# Patient Record
Sex: FEMALE | Race: WHITE | NOT HISPANIC OR LATINO | URBAN - METROPOLITAN AREA
[De-identification: names, ages, dates, MRNs, and addresses within clinical notes are randomized per-mention and may not be internally consistent; named-entity substitution may affect disease eponyms.]

---

## 2021-01-04 ENCOUNTER — WEB ENCOUNTER (OUTPATIENT)
Dept: URBAN - METROPOLITAN AREA CLINIC 74 | Facility: CLINIC | Age: 76
End: 2021-01-04

## 2021-01-04 ENCOUNTER — OFFICE VISIT (OUTPATIENT)
Dept: URBAN - METROPOLITAN AREA CLINIC 74 | Facility: CLINIC | Age: 76
End: 2021-01-04
Payer: MEDICARE

## 2021-01-04 DIAGNOSIS — Z12.11 COLON CANCER SCREENING: ICD-10-CM

## 2021-01-04 DIAGNOSIS — R43.2 DYSGEUSIA: ICD-10-CM

## 2021-01-04 DIAGNOSIS — K63.89 SMALL INTESTINAL BACTERIAL OVERGROWTH (SIBO): ICD-10-CM

## 2021-01-04 PROCEDURE — 99204 OFFICE O/P NEW MOD 45 MIN: CPT | Performed by: STUDENT IN AN ORGANIZED HEALTH CARE EDUCATION/TRAINING PROGRAM

## 2021-01-04 PROCEDURE — 3017F COLORECTAL CA SCREEN DOC REV: CPT | Performed by: STUDENT IN AN ORGANIZED HEALTH CARE EDUCATION/TRAINING PROGRAM

## 2021-01-04 PROCEDURE — G9903 PT SCRN TBCO ID AS NON USER: HCPCS | Performed by: STUDENT IN AN ORGANIZED HEALTH CARE EDUCATION/TRAINING PROGRAM

## 2021-01-04 PROCEDURE — G8484 FLU IMMUNIZE NO ADMIN: HCPCS | Performed by: STUDENT IN AN ORGANIZED HEALTH CARE EDUCATION/TRAINING PROGRAM

## 2021-01-04 PROCEDURE — G8427 DOCREV CUR MEDS BY ELIG CLIN: HCPCS | Performed by: STUDENT IN AN ORGANIZED HEALTH CARE EDUCATION/TRAINING PROGRAM

## 2021-01-04 PROCEDURE — G8420 CALC BMI NORM PARAMETERS: HCPCS | Performed by: STUDENT IN AN ORGANIZED HEALTH CARE EDUCATION/TRAINING PROGRAM

## 2021-01-04 RX ORDER — DOCUSATE SODIUM 100 MG/1
TAKE 2 CAPSULES (200 MG) BY ORAL ROUTE ONCE DAILY AT BEDTIME AS NEEDED CAPSULE ORAL 1
Qty: 0 | Refills: 0 | Status: DISCONTINUED | COMMUNITY
Start: 1900-01-01

## 2021-01-04 RX ORDER — METHOCARBAMOL 750 MG/1
TABLET, FILM COATED ORAL
Qty: 0 | Refills: 0 | Status: ACTIVE | COMMUNITY
Start: 2017-02-16

## 2021-01-04 RX ORDER — BUTALBITAL, ACETAMINOPHEN, AND CAFFEINE 50; 300; 40 MG/1; MG/1; MG/1
CAPSULE ORAL
Qty: 0 | Refills: 0 | Status: ACTIVE | COMMUNITY
Start: 1900-01-01

## 2021-01-04 RX ORDER — MIRTAZAPINE 15 MG/1
1 TABLET AT BEDTIME TABLET, FILM COATED ORAL ONCE A DAY
Status: ACTIVE | COMMUNITY

## 2021-01-04 RX ORDER — DICLOFENAC SODIUM 10 MG/G
GEL TOPICAL
Qty: 0 | Refills: 0 | Status: ACTIVE | COMMUNITY
Start: 1900-01-01

## 2021-01-04 RX ORDER — ONDANSETRON 4 MG/1
TABLET, ORALLY DISINTEGRATING ORAL
Qty: 0 | Refills: 0 | Status: ACTIVE | COMMUNITY
Start: 2017-08-15

## 2021-01-04 RX ORDER — ALPRAZOLAM 0.5 MG/1
TABLET ORAL
Qty: 0 | Refills: 0 | Status: ACTIVE | COMMUNITY
Start: 1900-01-01

## 2021-01-04 RX ORDER — ACETAMINOPHEN 325 MG/1
TABLET, FILM COATED ORAL
Qty: 0 | Refills: 0 | Status: ACTIVE | COMMUNITY
Start: 1900-01-01

## 2021-01-04 RX ORDER — IBUPROFEN 200 MG
TAKE 1 TABLET (200 MG) BY ORAL ROUTE EVERY 6 HOURS AS NEEDED WITH FOOD TABLET ORAL
Qty: 0 | Refills: 0 | Status: DISCONTINUED | COMMUNITY
Start: 1900-01-01

## 2021-01-04 RX ORDER — TRAMADOL HYDROCHLORIDE 50 MG/1
TABLET ORAL
Qty: 0 | Refills: 0 | Status: ACTIVE | COMMUNITY
Start: 2017-12-17

## 2021-01-04 NOTE — HPI-TODAY'S VISIT:
75-year-old female Patient is new to me. It appears patient has seen multiple doctors for work-up of her GI issues in the past. Patient comes in for evaluation of sour taste in the mouth.  This has been ongoing for many years.  Patient denies any reflux or dysphagia.  Patient had at least 2 endoscopies which were negative for any obvious signs of inflammation consistent with reflux.  Patient also had pH monitoring study which again was negative for reflux but patient reports that the study may not have been performed in the correct manner as she was not aware when and how to click the button when she gets the symptoms. Patient currently is not on any PPI therapy but in the past has tried them with no change in her symptoms. In addition patient also has been diagnosed with SIBO in the past.  Patient has received multiple rounds of rifaximin.  Patient does reports bloating which she says she has kind of getting used to live with it and is not as bothersome as it was before. Denies any significant change in bowel habits.  No diarrhea or constipation.  No blood in the stool.  No prior abdomen surgeries. No NSAID use. Last colonoscopy was about 12 or 13 years back which was negative for any polyps. No family history of GI malignancy.

## 2021-01-05 PROBLEM — 446081009: Status: ACTIVE | Noted: 2021-01-05

## 2021-01-05 PROBLEM — 271801002: Status: ACTIVE | Noted: 2021-01-05

## 2021-01-27 ENCOUNTER — OFFICE VISIT (OUTPATIENT)
Dept: URBAN - METROPOLITAN AREA CLINIC 74 | Facility: CLINIC | Age: 76
End: 2021-01-27
Payer: MEDICARE

## 2021-01-27 DIAGNOSIS — K21.9 GASTROESOPHAGEAL REFLUX DISEASE WITHOUT ESOPHAGITIS: ICD-10-CM

## 2021-01-27 DIAGNOSIS — Z12.11 COLON CANCER SCREENING: ICD-10-CM

## 2021-01-27 DIAGNOSIS — R10.13 EPIGASTRIC PAIN: ICD-10-CM

## 2021-01-27 PROBLEM — 266435005: Status: ACTIVE | Noted: 2021-01-27

## 2021-01-27 PROBLEM — 79922009: Status: ACTIVE | Noted: 2021-01-27

## 2021-01-27 PROCEDURE — G9903 PT SCRN TBCO ID AS NON USER: HCPCS | Performed by: STUDENT IN AN ORGANIZED HEALTH CARE EDUCATION/TRAINING PROGRAM

## 2021-01-27 PROCEDURE — G8427 DOCREV CUR MEDS BY ELIG CLIN: HCPCS | Performed by: STUDENT IN AN ORGANIZED HEALTH CARE EDUCATION/TRAINING PROGRAM

## 2021-01-27 PROCEDURE — 3017F COLORECTAL CA SCREEN DOC REV: CPT | Performed by: STUDENT IN AN ORGANIZED HEALTH CARE EDUCATION/TRAINING PROGRAM

## 2021-01-27 PROCEDURE — G8420 CALC BMI NORM PARAMETERS: HCPCS | Performed by: STUDENT IN AN ORGANIZED HEALTH CARE EDUCATION/TRAINING PROGRAM

## 2021-01-27 PROCEDURE — G8484 FLU IMMUNIZE NO ADMIN: HCPCS | Performed by: STUDENT IN AN ORGANIZED HEALTH CARE EDUCATION/TRAINING PROGRAM

## 2021-01-27 PROCEDURE — 99214 OFFICE O/P EST MOD 30 MIN: CPT | Performed by: STUDENT IN AN ORGANIZED HEALTH CARE EDUCATION/TRAINING PROGRAM

## 2021-01-27 RX ORDER — MIRTAZAPINE 15 MG/1
1 TABLET AT BEDTIME TABLET, FILM COATED ORAL ONCE A DAY
Status: ACTIVE | COMMUNITY

## 2021-01-27 RX ORDER — ONDANSETRON 4 MG/1
TABLET, ORALLY DISINTEGRATING ORAL
Qty: 0 | Refills: 0 | Status: ACTIVE | COMMUNITY
Start: 2017-08-15

## 2021-01-27 RX ORDER — BUTALBITAL, ACETAMINOPHEN, AND CAFFEINE 50; 300; 40 MG/1; MG/1; MG/1
CAPSULE ORAL
Qty: 0 | Refills: 0 | Status: ACTIVE | COMMUNITY
Start: 1900-01-01

## 2021-01-27 RX ORDER — ALPRAZOLAM 0.5 MG/1
TABLET ORAL
Qty: 0 | Refills: 0 | Status: ACTIVE | COMMUNITY
Start: 1900-01-01

## 2021-01-27 RX ORDER — TRAMADOL HYDROCHLORIDE 50 MG/1
TABLET ORAL
Qty: 0 | Refills: 0 | Status: ACTIVE | COMMUNITY
Start: 2017-12-17

## 2021-01-27 RX ORDER — DICLOFENAC SODIUM 10 MG/G
GEL TOPICAL
Qty: 0 | Refills: 0 | Status: ACTIVE | COMMUNITY
Start: 1900-01-01

## 2021-01-27 RX ORDER — METHOCARBAMOL 750 MG/1
TABLET, FILM COATED ORAL
Qty: 0 | Refills: 0 | Status: ACTIVE | COMMUNITY
Start: 2017-02-16

## 2021-01-27 RX ORDER — ACETAMINOPHEN 325 MG/1
TABLET, FILM COATED ORAL
Qty: 0 | Refills: 0 | Status: ACTIVE | COMMUNITY
Start: 1900-01-01

## 2021-01-27 NOTE — HPI-TODAY'S VISIT:
75-year-old female Comes in for follow-up visit Seen by multiple doctors in the past.  Carries multiple diagnoses of acid reflux, bile reflux, small intestine bacterial overgrowth syndrome, irritable bowel syndrome. All prior records were reviewed. Last positive hydrogen breath test for small intestine bacterial overgrowth was in 2018 after which patient was treated with Augmentin for about 3 weeks.  No repeat test after that. Patient has been treated for presumed bile reflux with cholestyramine in the past with symptomatic improvement for about 4 weeks but then with recurrence of symptoms. Patient has also been treated with PPI for presumed acid reflux but again did not responded well to weight and hence treatment was discontinued. Patient also had EGD with ambulatory pH monitoring including Bravo study which was essentially normal.  Patient says that the study was painful for her and she was not told exactly instructions about how to click on when her symptoms are coming.  She feels that is the reason why the study was normal when truly it should not be. Patient last colonoscopy was in 2007.  Patient is overdue for it. Patient also carries a diagnosis of possible fructose intolerance and was at one time recommended that she should get a small bowel enteroscopy study by one of her prior doctors.  Although the reasoning behind this is not very clear. At this time the most bothersome symptom for patient is sour taste in the mouth, intermittent vague epigastric abdomen pain which is mild in intensity and not that worrisome, this pain has no relationship to meal intake or bowel movement.  No nausea or vomiting.  Moves her bowels every day.  No constipation or diarrhea.  No blood in the stool.  Otherwise appetite is at baseline and denies any weight loss. Of note, patient reports significant stress in her life especially involving her family. Patient at this time does not want to repeat impedance pH study or Bravo study. Patient also does not want to schedule her colonoscopy at this time.

## 2021-06-16 ENCOUNTER — WEB ENCOUNTER (OUTPATIENT)
Dept: URBAN - METROPOLITAN AREA CLINIC 74 | Facility: CLINIC | Age: 76
End: 2021-06-16

## 2021-06-16 ENCOUNTER — OFFICE VISIT (OUTPATIENT)
Dept: URBAN - METROPOLITAN AREA CLINIC 74 | Facility: CLINIC | Age: 76
End: 2021-06-16
Payer: MEDICARE

## 2021-06-16 VITALS
BODY MASS INDEX: 20.03 KG/M2 | TEMPERATURE: 97.7 F | OXYGEN SATURATION: 97 % | WEIGHT: 124.6 LBS | SYSTOLIC BLOOD PRESSURE: 138 MMHG | HEART RATE: 92 BPM | HEIGHT: 66 IN | DIASTOLIC BLOOD PRESSURE: 66 MMHG

## 2021-06-16 DIAGNOSIS — Z12.11 COLON CANCER SCREENING: ICD-10-CM

## 2021-06-16 DIAGNOSIS — R19.4 CHANGE IN BOWEL HABIT: ICD-10-CM

## 2021-06-16 DIAGNOSIS — K58.1 IRRITABLE BOWEL SYNDROME WITH CONSTIPATION: ICD-10-CM

## 2021-06-16 PROBLEM — 440630006: Status: ACTIVE | Noted: 2021-06-16

## 2021-06-16 PROBLEM — 305058001: Status: ACTIVE | Noted: 2021-01-05

## 2021-06-16 PROCEDURE — 99213 OFFICE O/P EST LOW 20 MIN: CPT | Performed by: STUDENT IN AN ORGANIZED HEALTH CARE EDUCATION/TRAINING PROGRAM

## 2021-06-16 RX ORDER — DICLOFENAC SODIUM 10 MG/G
GEL TOPICAL
Qty: 0 | Refills: 0 | Status: ACTIVE | COMMUNITY
Start: 1900-01-01

## 2021-06-16 RX ORDER — MIRTAZAPINE 15 MG/1
1 TABLET AT BEDTIME TABLET, FILM COATED ORAL ONCE A DAY
Status: ACTIVE | COMMUNITY

## 2021-06-16 RX ORDER — BUTALBITAL, ACETAMINOPHEN, AND CAFFEINE 50; 300; 40 MG/1; MG/1; MG/1
CAPSULE ORAL
Qty: 0 | Refills: 0 | Status: ACTIVE | COMMUNITY
Start: 1900-01-01

## 2021-06-16 RX ORDER — TRAMADOL HYDROCHLORIDE 50 MG/1
TABLET ORAL
Qty: 0 | Refills: 0 | Status: ACTIVE | COMMUNITY
Start: 2017-12-17

## 2021-06-16 RX ORDER — METHOCARBAMOL 750 MG/1
TABLET, FILM COATED ORAL
Qty: 0 | Refills: 0 | Status: ACTIVE | COMMUNITY
Start: 2017-02-16

## 2021-06-16 RX ORDER — ACETAMINOPHEN 325 MG/1
TABLET, FILM COATED ORAL
Qty: 0 | Refills: 0 | Status: ACTIVE | COMMUNITY
Start: 1900-01-01

## 2021-06-16 RX ORDER — LINACLOTIDE 290 UG/1
1 CAPSULE AT LEAST 30 MINUTES BEFORE THE FIRST MEAL OF THE DAY ON AN EMPTY STOMACH CAPSULE, GELATIN COATED ORAL ONCE A DAY
Qty: 90 | Refills: 0 | OUTPATIENT
Start: 2021-06-16 | End: 2021-09-14

## 2021-06-16 RX ORDER — SODIUM, POTASSIUM,MAG SULFATES 17.5-3.13G
354 ML SOLUTION, RECONSTITUTED, ORAL ORAL
Qty: 1 | Refills: 0 | OUTPATIENT
Start: 2021-06-16 | End: 2021-06-17

## 2021-06-16 RX ORDER — ALPRAZOLAM 0.5 MG/1
TABLET ORAL
Qty: 0 | Refills: 0 | Status: ACTIVE | COMMUNITY
Start: 1900-01-01

## 2021-06-16 RX ORDER — ONDANSETRON 4 MG/1
TABLET, ORALLY DISINTEGRATING ORAL
Qty: 0 | Refills: 0 | Status: ACTIVE | COMMUNITY
Start: 2017-08-15

## 2021-06-16 NOTE — HPI-TODAY'S VISIT:
75-year-old female Seen by me in the past for multiple GI complaints as noted in the prior note. This time comes in for evaluation of change in bowel movements with predominant constipation and also associated inability to urinate as easily as she was doing before.  Patient also currently suffering from herpes infection and is currently taking treatment for that.  Has multiple chronic GI symptoms.  But patient is most concerned about acute change in bowel habit.  Unable to move her bowels for couple of weeks and has been now taking MiraLAX with only minimal response.  Denies seeing any blood or mucus in the stool.  Does reports lower abdomen pain.  Chronic nausea.  Chronic reflux.  No fever or chills.

## 2024-04-30 ENCOUNTER — OV EP (OUTPATIENT)
Dept: URBAN - METROPOLITAN AREA CLINIC 74 | Facility: CLINIC | Age: 79
End: 2024-04-30

## 2024-04-30 VITALS
OXYGEN SATURATION: 99 % | HEART RATE: 83 BPM | HEIGHT: 66 IN | WEIGHT: 119.8 LBS | DIASTOLIC BLOOD PRESSURE: 72 MMHG | TEMPERATURE: 97.7 F | BODY MASS INDEX: 19.25 KG/M2 | SYSTOLIC BLOOD PRESSURE: 128 MMHG

## 2024-04-30 RX ORDER — ALPRAZOLAM 0.5 MG/1
1 TABLET TABLET ORAL TWICE A DAY
Status: ACTIVE | COMMUNITY

## 2024-04-30 RX ORDER — TRAMADOL HYDROCHLORIDE 50 MG/1
1 TABLET AS NEEDED TABLET, FILM COATED ORAL ONCE A DAY
Status: ACTIVE | COMMUNITY

## 2024-04-30 RX ORDER — METHOCARBAMOL 750 MG/1
1 TABLET TABLET ORAL
Status: ACTIVE | COMMUNITY

## 2024-04-30 RX ORDER — MELATONIN 10 MG
AS DIRECTED CAPSULE ORAL
Status: ACTIVE | COMMUNITY

## 2024-04-30 RX ORDER — MIRTAZAPINE 15 MG/1
1 TABLET AT BEDTIME TABLET, FILM COATED ORAL ONCE A DAY
Status: ACTIVE | COMMUNITY

## 2024-04-30 NOTE — HPI-TODAY'S VISIT:
Patient Presentation: The patient presents with a chief complaint of longstanding gastrointestinal issues and difficulty in regulating bowel movements since her shingles episode in 2021. She has a history of gastroparesis, which began in 2017, and was managed well until the shingles incident. Medical History and Current Medications: The patient has a history of gastroparesis, which began in 2017, and was managed well until the shingles incident in 2021. She reports that Linzess, prescribed by her previous doctor, caused excessive gas and was too potent even at a lower dose. She has tried Miralax and Mag-07, with limited success in achieving regular bowel movements. The patient has not had a bowel movement in 2 days and is seeking alternative treatment options. 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 She has not received the shingles vaccine due to fear of side effects. She reports occasional dull abdominal pain and hip pain, which she believes is related to nerves in her gut. She has previously reacted to onion and garlic and has been on a low-fat diet with good results. Additional Information: The patient has tried exercises recommended by a person from [employer] and has been prescribed physical therapy by Dr. Kim, but is unable to attend due to her 's bedridden condition. She has not tried a squatty potty but is open to the idea.

## 2024-05-02 PROBLEM — 179950008: Status: ACTIVE | Noted: 2024-05-02

## 2024-05-02 PROBLEM — 266435005: Status: ACTIVE | Noted: 2024-05-02

## 2024-05-07 ENCOUNTER — TELEPHONE ENCOUNTER (OUTPATIENT)
Dept: URBAN - METROPOLITAN AREA CLINIC 74 | Facility: CLINIC | Age: 79
End: 2024-05-07

## 2024-05-30 ENCOUNTER — WEB ENCOUNTER (OUTPATIENT)
Dept: URBAN - METROPOLITAN AREA CLINIC 19 | Facility: CLINIC | Age: 79
End: 2024-05-30

## 2024-07-30 ENCOUNTER — OFFICE VISIT (OUTPATIENT)
Dept: URBAN - METROPOLITAN AREA CLINIC 74 | Facility: CLINIC | Age: 79
End: 2024-07-30

## 2024-07-30 ENCOUNTER — OFFICE VISIT (OUTPATIENT)
Dept: URBAN - METROPOLITAN AREA CLINIC 74 | Facility: CLINIC | Age: 79
End: 2024-07-30
Payer: MEDICARE

## 2024-07-30 ENCOUNTER — LAB OUTSIDE AN ENCOUNTER (OUTPATIENT)
Dept: URBAN - METROPOLITAN AREA CLINIC 74 | Facility: CLINIC | Age: 79
End: 2024-07-30

## 2024-07-30 ENCOUNTER — DASHBOARD ENCOUNTERS (OUTPATIENT)
Age: 79
End: 2024-07-30

## 2024-07-30 VITALS
HEART RATE: 75 BPM | SYSTOLIC BLOOD PRESSURE: 142 MMHG | DIASTOLIC BLOOD PRESSURE: 80 MMHG | TEMPERATURE: 98.8 F | HEIGHT: 66 IN | WEIGHT: 117.4 LBS | OXYGEN SATURATION: 99 % | BODY MASS INDEX: 18.87 KG/M2

## 2024-07-30 DIAGNOSIS — E55.9 VITAMIN D DEFICIENCY: ICD-10-CM

## 2024-07-30 DIAGNOSIS — R19.8 ALTERNATING CONSTIPATION AND DIARRHEA: ICD-10-CM

## 2024-07-30 DIAGNOSIS — K59.09 CHRONIC CONSTIPATION: ICD-10-CM

## 2024-07-30 DIAGNOSIS — R68.81 EARLY SATIETY: ICD-10-CM

## 2024-07-30 DIAGNOSIS — R14.0 ABDOMINAL BLOATING: ICD-10-CM

## 2024-07-30 DIAGNOSIS — R63.4 WEIGHT LOSS: ICD-10-CM

## 2024-07-30 DIAGNOSIS — K30 FUNCTIONAL DYSPEPSIA: ICD-10-CM

## 2024-07-30 DIAGNOSIS — K59.02 DYSSYNERGIC DEFECATION: ICD-10-CM

## 2024-07-30 PROBLEM — 3696007: Status: ACTIVE | Noted: 2024-07-30

## 2024-07-30 PROBLEM — 34713006: Status: ACTIVE | Noted: 2024-07-30

## 2024-07-30 PROCEDURE — 99214 OFFICE O/P EST MOD 30 MIN: CPT | Performed by: PHYSICIAN ASSISTANT

## 2024-07-30 RX ORDER — MELATONIN 10 MG
AS DIRECTED CAPSULE ORAL
Status: ACTIVE | COMMUNITY

## 2024-07-30 RX ORDER — LACTULOSE 10 G/15ML
15 MILLILITER SOLUTION ORAL TWICE DAILY
Qty: 900 MILLILITER | Refills: 3 | Status: DISCONTINUED | COMMUNITY
Start: 2024-05-02 | End: 2024-08-30

## 2024-07-30 RX ORDER — MAGNESIUM OXIDE/MAG AA CHELATE 300 MG
1 CAPSULE WITH A MEAL CAPSULE ORAL ONCE A DAY
Status: ACTIVE | COMMUNITY

## 2024-07-30 RX ORDER — ALPRAZOLAM 0.5 MG/1
1 TABLET TABLET ORAL TWICE A DAY
Status: DISCONTINUED | COMMUNITY

## 2024-07-30 RX ORDER — MIRTAZAPINE 15 MG/1
1 TABLET AT BEDTIME TABLET, FILM COATED ORAL ONCE A DAY
Status: ACTIVE | COMMUNITY

## 2024-07-30 RX ORDER — METHOCARBAMOL 750 MG/1
1 TABLET TABLET ORAL
Status: ACTIVE | COMMUNITY

## 2024-07-30 RX ORDER — ALPRAZOLAM 0.5 MG/1
1 TABLET TABLET ORAL TWICE A DAY
Status: ACTIVE | COMMUNITY

## 2024-07-30 RX ORDER — TRAMADOL HYDROCHLORIDE 50 MG/1
1 TABLET AS NEEDED TABLET, FILM COATED ORAL ONCE A DAY
Status: ACTIVE | COMMUNITY

## 2024-07-30 NOTE — HPI-TODAY'S VISIT:
The patient is 78-year-old female with past medical history as noted below known to Dr. Casey is presented to our clinic today for follow-up appointment.  The patient has known history of gastritis, gastroparesis, and chronic constipation.  She is well-known to [facility] [place].  Last colonoscopy in November 2021.  Last EGD in August 2018.  She was started on Lactulose 10 g / 15 mL every 12 hours last visit.  She was advised to continue with magnesium daily.  She was given FODMAP diet.  She also has history of Shingles with post hepatic neuropathy in 2021.  She istates that she was not able to take Lactulose due sever cramps, Linzess 72 mcg, 145 mcg, 290 mcg with sever diarrhea, Magnesium with diarrhea, Miralax, Bisacodyl, and  OTC Laxative with poor response. She continues with chronic abdominal pain,  cramping, alternating bowel movements with diarrhea, and blaoting. She had three loose stools this am. No recent imaging or labs since 2021. Last GES in 2011 with gastroparesis.   Procedures: -- Colonoscopy 11/20/2021 by Dr. Pillai noted several diverticula with mixed openings were seen in the whole colon.  Additional findings included in luminal narrowing.  Diverticulosis appeared to be of mild severity.  Most dense in the sigmoid.  Medium internal hemorrhoids were noted.  The colon and rectum were otherwise normal.  There was no evidence of polyps, masses, AVMs, or colitis visualized.  -- EGD with biopsy on 03/0/2011 by Dr. Zimmerman noted LA grade A reflux esophagitis.  Hiatal hernia.  Gastritis.  Normal examined duodenum.  Biopsy with normal gastric mucosa with mild chronic inflammation. No H.pylori organism or intestinal metaplasia.

## 2024-07-31 LAB
A/G RATIO: 2
ABSOLUTE BASOPHILS: 50
ABSOLUTE EOSINOPHILS: 82
ABSOLUTE LYMPHOCYTES: 1392
ABSOLUTE MONOCYTES: 441
ABSOLUTE NEUTROPHILS: 4334
ALBUMIN: 4.6
ALKALINE PHOSPHATASE: 69
ALT (SGPT): 14
AMYLASE: 48
AST (SGOT): 18
BASOPHILS: 0.8
BILIRUBIN, TOTAL: 0.6
BUN/CREATININE RATIO: 28
BUN: 26
C-REACTIVE PROTEIN, QUANT: <3
CALCIUM: 9.9
CARBON DIOXIDE, TOTAL: 28
CHLORIDE: 103
CREATININE: 0.94
EGFR: 63
EOSINOPHILS: 1.3
GLOBULIN, TOTAL: 2.3
GLUCOSE: 82
HEMATOCRIT: 42.9
HEMOGLOBIN: 14.2
LIPASE: 28
LYMPHOCYTES: 22.1
MCH: 30.7
MCHC: 33.1
MCV: 92.9
MONOCYTES: 7
MPV: 9.3
NEUTROPHILS: 68.8
PLATELET COUNT: 338
POTASSIUM: 4.5
PROTEIN, TOTAL: 6.9
RDW: 11.9
RED BLOOD CELL COUNT: 4.62
SODIUM: 141
TSH W/REFLEX TO FT4: 2.49
VITAMIN D,25-OH,TOTAL,IA: 80
WHITE BLOOD CELL COUNT: 6.3

## 2024-08-01 ENCOUNTER — TELEPHONE ENCOUNTER (OUTPATIENT)
Dept: URBAN - METROPOLITAN AREA CLINIC 74 | Facility: CLINIC | Age: 79
End: 2024-08-01

## 2024-08-02 ENCOUNTER — TELEPHONE ENCOUNTER (OUTPATIENT)
Dept: URBAN - METROPOLITAN AREA CLINIC 92 | Facility: CLINIC | Age: 79
End: 2024-08-02

## 2024-08-03 ENCOUNTER — LAB OUTSIDE AN ENCOUNTER (OUTPATIENT)
Dept: URBAN - METROPOLITAN AREA CLINIC 74 | Facility: CLINIC | Age: 79
End: 2024-08-03

## 2024-08-10 LAB
ADENOVIRUS F 40/41: NOT DETECTED
CAMPYLOBACTER: NOT DETECTED
CLOSTRIDIUM DIFFICILE: NOT DETECTED
ENTAMOEBA HISTOLYTICA: NOT DETECTED
ENTEROAGGREGATIVE E.COLI: NOT DETECTED
ENTEROTOXIGENIC E.COLI: NOT DETECTED
ESCHERICHIA COLI O157: NOT DETECTED
GIARDIA LAMBLIA: NOT DETECTED
NOROVIRUS GI/GII: NOT DETECTED
PANCREATICELASTASE ELISA, STOOL: (no result)
ROTAVIRUS A: NOT DETECTED
SALMONELLA SPP.: NOT DETECTED
SHIGA-LIKE TOXIN PRODUCING E.COLI: NOT DETECTED
SHIGELLA SPP. / ENTEROINVASIVE E.COLI: NOT DETECTED
STOOL, WHITE BLOOD CELLS: (no result)
VIBRIO PARAHAEMOLYTICUS: NOT DETECTED
VIBRIO SPP.: NOT DETECTED
YERSINIA ENTEROCOLITICA: NOT DETECTED

## 2024-08-13 NOTE — PHYSICAL EXAM CARDIOVASCULAR:
no edema,  no murmurs,  regular rate and rhythm , no edema,  no murmurs,  regular rate and rhythm , no edema,  no murmurs,  regular rate and rhythm , no edema,  no murmurs,  regular rate and rhythm 
[FreeTextEntry1] : Refer to intake documentation from 4/9/24 for details.

## 2024-08-27 ENCOUNTER — LAB OUTSIDE AN ENCOUNTER (OUTPATIENT)
Dept: URBAN - METROPOLITAN AREA CLINIC 74 | Facility: CLINIC | Age: 79
End: 2024-08-27

## 2024-08-27 ENCOUNTER — OFFICE VISIT (OUTPATIENT)
Dept: URBAN - METROPOLITAN AREA CLINIC 74 | Facility: CLINIC | Age: 79
End: 2024-08-27
Payer: MEDICARE

## 2024-08-27 VITALS
WEIGHT: 117.8 LBS | TEMPERATURE: 98.5 F | HEIGHT: 63 IN | HEART RATE: 76 BPM | SYSTOLIC BLOOD PRESSURE: 148 MMHG | BODY MASS INDEX: 20.87 KG/M2 | DIASTOLIC BLOOD PRESSURE: 70 MMHG

## 2024-08-27 DIAGNOSIS — R10.84 DIFFUSE ABDOMINAL PAIN: ICD-10-CM

## 2024-08-27 DIAGNOSIS — K59.09 CHRONIC CONSTIPATION: ICD-10-CM

## 2024-08-27 DIAGNOSIS — K86.81 EXOCRINE PANCREATIC INSUFFICIENCY: ICD-10-CM

## 2024-08-27 PROCEDURE — 99214 OFFICE O/P EST MOD 30 MIN: CPT | Performed by: PHYSICIAN ASSISTANT

## 2024-08-27 RX ORDER — ALPRAZOLAM 0.5 MG/1
1 TABLET TABLET ORAL TWICE A DAY
Status: ACTIVE | COMMUNITY

## 2024-08-27 RX ORDER — MAGNESIUM OXIDE/MAG AA CHELATE 300 MG
1 CAPSULE WITH A MEAL CAPSULE ORAL ONCE A DAY
Status: ACTIVE | COMMUNITY

## 2024-08-27 RX ORDER — MIRTAZAPINE 15 MG/1
1 TABLET AT BEDTIME TABLET, FILM COATED ORAL ONCE A DAY
Status: ACTIVE | COMMUNITY

## 2024-08-27 RX ORDER — TRAMADOL HYDROCHLORIDE 50 MG/1
1 TABLET AS NEEDED TABLET, FILM COATED ORAL ONCE A DAY
Status: ACTIVE | COMMUNITY

## 2024-08-27 RX ORDER — METHOCARBAMOL 750 MG/1
1 TABLET TABLET ORAL
Status: ACTIVE | COMMUNITY

## 2024-08-27 RX ORDER — MELATONIN 10 MG
AS DIRECTED CAPSULE ORAL
Status: ACTIVE | COMMUNITY

## 2024-08-27 NOTE — HPI-TODAY'S VISIT:
The patient is 78-year-old female with past medical history as noted below known to Dr. Casey is presented to our clinic today for follow-up appointmet to macy her recent labs, stool study, and GES results. She continues with 1-2 small bowel movements per day by using Magnesium. She denies any new GI issues today.    Diagnostic studies: -- GES on 08/20/204 with near-complete gastric emptying study at two hours. No gastroparesis. These findings can be seen in the dumping syndrome.  -- Stool study on 08/03/2024 GPP and WBC unremarkable.  Pancreatic elastase 147.52 consistent with exocrine pancreatic insufficiency.  -- Labs on 07/30/2024 CMP, CBC, amylase, lipase, CRP, TSH, free T4, and vitamin D with unremarkable results.   Procedures: -- Colonoscopy 11/20/2021 by Dr. Pillai noted several diverticula with mixed openings were seen in the whole colon.  Additional findings included in luminal narrowing.  Diverticulosis appeared to be of mild severity.  Most dense in the sigmoid.  Medium internal hemorrhoids were noted.  The colon and rectum were otherwise normal.  There was no evidence of polyps, masses, AVMs, or colitis visualized.  -- EGD with biopsy on 03/0/2011 by Dr. Zimmerman noted LA grade A reflux esophagitis.  Hiatal hernia.  Gastritis.  Normal examined duodenum.  Biopsy with normal gastric mucosa with mild chronic inflammation. No H.pylori organism or intestinal metaplasia.

## 2024-09-03 ENCOUNTER — TELEPHONE ENCOUNTER (OUTPATIENT)
Dept: URBAN - METROPOLITAN AREA CLINIC 74 | Facility: CLINIC | Age: 79
End: 2024-09-03

## 2024-09-03 RX ORDER — PANCRELIPASE 36000; 180000; 114000 [USP'U]/1; [USP'U]/1; [USP'U]/1
2 TABLETS WITH MEALS, AND 1 TABLET WITH SNACKS CAPSULE, DELAYED RELEASE PELLETS ORAL
Qty: 300 | Refills: 3 | OUTPATIENT
Start: 2024-09-03 | End: 2025-08-29

## 2024-09-04 ENCOUNTER — LAB OUTSIDE AN ENCOUNTER (OUTPATIENT)
Dept: URBAN - METROPOLITAN AREA CLINIC 74 | Facility: CLINIC | Age: 79
End: 2024-09-04

## 2024-09-05 ENCOUNTER — TELEPHONE ENCOUNTER (OUTPATIENT)
Dept: URBAN - METROPOLITAN AREA CLINIC 74 | Facility: CLINIC | Age: 79
End: 2024-09-05

## 2024-09-08 ENCOUNTER — WEB ENCOUNTER (OUTPATIENT)
Dept: URBAN - METROPOLITAN AREA CLINIC 74 | Facility: CLINIC | Age: 79
End: 2024-09-08

## 2024-09-09 ENCOUNTER — TELEPHONE ENCOUNTER (OUTPATIENT)
Dept: URBAN - METROPOLITAN AREA CLINIC 74 | Facility: CLINIC | Age: 79
End: 2024-09-09

## 2024-09-16 ENCOUNTER — TELEPHONE ENCOUNTER (OUTPATIENT)
Dept: URBAN - METROPOLITAN AREA CLINIC 74 | Facility: CLINIC | Age: 79
End: 2024-09-16

## 2024-09-24 ENCOUNTER — OFFICE VISIT (OUTPATIENT)
Dept: URBAN - METROPOLITAN AREA CLINIC 74 | Facility: CLINIC | Age: 79
End: 2024-09-24
Payer: MEDICARE

## 2024-09-24 VITALS
WEIGHT: 118 LBS | HEIGHT: 63 IN | SYSTOLIC BLOOD PRESSURE: 110 MMHG | HEART RATE: 62 BPM | BODY MASS INDEX: 20.91 KG/M2 | OXYGEN SATURATION: 98 % | DIASTOLIC BLOOD PRESSURE: 80 MMHG

## 2024-09-24 DIAGNOSIS — R10.84 DIFFUSE ABDOMINAL PAIN: ICD-10-CM

## 2024-09-24 DIAGNOSIS — R53.82 CHRONIC FATIGUE: ICD-10-CM

## 2024-09-24 DIAGNOSIS — K86.81 EXOCRINE PANCREATIC INSUFFICIENCY: ICD-10-CM

## 2024-09-24 DIAGNOSIS — K59.02 DYSSYNERGIC DEFECATION: ICD-10-CM

## 2024-09-24 DIAGNOSIS — K59.09 CHRONIC CONSTIPATION: ICD-10-CM

## 2024-09-24 DIAGNOSIS — E55.9 VITAMIN D DEFICIENCY: ICD-10-CM

## 2024-09-24 PROBLEM — 84229001: Status: ACTIVE | Noted: 2024-09-24

## 2024-09-24 PROCEDURE — 99214 OFFICE O/P EST MOD 30 MIN: CPT | Performed by: INTERNAL MEDICINE

## 2024-09-24 RX ORDER — MELATONIN 10 MG
AS DIRECTED CAPSULE ORAL
Status: ACTIVE | COMMUNITY

## 2024-09-24 RX ORDER — METHOCARBAMOL 750 MG/1
1 TABLET TABLET ORAL
Status: ACTIVE | COMMUNITY

## 2024-09-24 RX ORDER — MIRTAZAPINE 15 MG/1
1 TABLET AT BEDTIME TABLET, FILM COATED ORAL ONCE A DAY
Status: ACTIVE | COMMUNITY

## 2024-09-24 RX ORDER — TRAMADOL HYDROCHLORIDE 50 MG/1
1 TABLET AS NEEDED TABLET, FILM COATED ORAL ONCE A DAY
Status: ACTIVE | COMMUNITY

## 2024-09-24 RX ORDER — PANCRELIPASE 36000; 180000; 114000 [USP'U]/1; [USP'U]/1; [USP'U]/1
2 TABLETS WITH MEALS, AND 1 TABLET WITH SNACKS CAPSULE, DELAYED RELEASE PELLETS ORAL
Qty: 300 | Refills: 3 | Status: ON HOLD | COMMUNITY
Start: 2024-09-03 | End: 2025-08-29

## 2024-09-24 RX ORDER — ALPRAZOLAM 0.5 MG/1
1 TABLET TABLET ORAL TWICE A DAY
Status: ACTIVE | COMMUNITY

## 2024-09-24 RX ORDER — MAGNESIUM OXIDE/MAG AA CHELATE 300 MG
1 CAPSULE WITH A MEAL CAPSULE ORAL ONCE A DAY
Status: ACTIVE | COMMUNITY

## 2024-09-24 NOTE — HPI-TODAY'S VISIT:
The patient is 78-year-old female with past medical history as noted below is presented to our clinic today for follow-up. She continues with 1-2 small bowel movements per day by using Magnesium. c/o feeling bloated and very fatigued. has to strain to have BM   Diagnostic studies: -- GES on 08/20/204 with near-complete gastric emptying study at two hours. No gastroparesis. These findings can be seen in the dumping syndrome.  -- Stool study on 08/03/2024 GPP and WBC unremarkable.  Pancreatic elastase 147.52 consistent with exocrine pancreatic insufficiency.  -- Labs on 07/30/2024 CMP, CBC, amylase, lipase, CRP, TSH, free T4, and vitamin D with unremarkable results.   Procedures: -- Colonoscopy 11/20/2021 by Dr. Pillai noted several diverticula with mixed openings were seen in the whole colon.  Additional findings included in luminal narrowing.  Diverticulosis appeared to be of mild severity.  Most dense in the sigmoid.  Medium internal hemorrhoids were noted.  The colon and rectum were otherwise normal.  There was no evidence of polyps, masses, AVMs, or colitis visualized.  -- EGD with biopsy on 03/0/2011 by Dr. Zimmerman noted LA grade A reflux esophagitis.  Hiatal hernia.  Gastritis.  Normal examined duodenum.  Biopsy with normal gastric mucosa with mild chronic inflammation. No H.pylori organism or intestinal metaplasia. CT-kidney stones, cyst in the spine. did not tolerate creon/zenpep. Made her feel worse.

## 2024-09-24 NOTE — PHYSICAL EXAM GASTROINTESTINAL
Abdomen , soft, mild bloating/TTP diffusely nondistended , no guarding or rigidity , no masses palpable , normal bowel sounds , Liver and Spleen,  no hepatosplenomegaly , liver nontender

## 2024-09-25 LAB
FOLATE, SERUM: >24
MAGNESIUM: 2.4
VITAMIN B12: >2000
VITAMIN D,25-OH,TOTAL,IA: 68

## 2024-10-11 ENCOUNTER — TELEPHONE ENCOUNTER (OUTPATIENT)
Dept: URBAN - METROPOLITAN AREA CLINIC 74 | Facility: CLINIC | Age: 79
End: 2024-10-11

## 2024-10-11 RX ORDER — PANCRELIPASE LIPASE, PANCRELIPASE AMYLASE, AND PANCRELIPASE PROTEASE 21000; 83900; 54700 [USP'U]/1; [USP'U]/1; [USP'U]/1
2 PILLS WITH MEALS CAPSULE, DELAYED RELEASE ORAL THREE TIMES A DAY
Qty: 250 | Refills: 3 | OUTPATIENT
Start: 2024-10-11 | End: 2025-02-07

## 2024-10-15 ENCOUNTER — TELEPHONE ENCOUNTER (OUTPATIENT)
Dept: URBAN - METROPOLITAN AREA CLINIC 74 | Facility: CLINIC | Age: 79
End: 2024-10-15

## 2024-10-21 ENCOUNTER — TELEPHONE ENCOUNTER (OUTPATIENT)
Dept: URBAN - METROPOLITAN AREA CLINIC 74 | Facility: CLINIC | Age: 79
End: 2024-10-21

## 2024-11-15 ENCOUNTER — TELEPHONE ENCOUNTER (OUTPATIENT)
Dept: URBAN - METROPOLITAN AREA CLINIC 74 | Facility: CLINIC | Age: 79
End: 2024-11-15

## 2024-11-15 RX ORDER — OMEPRAZOLE 20 MG/1
TAKE 1 CAPSULE BY ORAL ROUTE DAILY FOR 30 DAYS CAPSULE, DELAYED RELEASE ORAL ONCE A DAY
Qty: 90 | Refills: 3 | OUTPATIENT
Start: 2024-11-15

## 2024-11-25 ENCOUNTER — OFFICE VISIT (OUTPATIENT)
Dept: URBAN - METROPOLITAN AREA CLINIC 74 | Facility: CLINIC | Age: 79
End: 2024-11-25
Payer: MEDICARE

## 2024-11-25 VITALS
BODY MASS INDEX: 22.52 KG/M2 | HEART RATE: 79 BPM | SYSTOLIC BLOOD PRESSURE: 140 MMHG | DIASTOLIC BLOOD PRESSURE: 82 MMHG | HEIGHT: 62 IN | WEIGHT: 122.4 LBS | TEMPERATURE: 98.2 F

## 2024-11-25 DIAGNOSIS — K59.02 DYSSYNERGIC DEFECATION: ICD-10-CM

## 2024-11-25 DIAGNOSIS — K59.09 CHRONIC CONSTIPATION: ICD-10-CM

## 2024-11-25 DIAGNOSIS — K86.81 EXOCRINE PANCREATIC INSUFFICIENCY: ICD-10-CM

## 2024-11-25 DIAGNOSIS — R14.0 BLOATING SYMPTOM: ICD-10-CM

## 2024-11-25 PROCEDURE — 99214 OFFICE O/P EST MOD 30 MIN: CPT | Performed by: PHYSICIAN ASSISTANT

## 2024-11-25 RX ORDER — ALPRAZOLAM 0.5 MG/1
1 TABLET TABLET ORAL TWICE A DAY
Status: ACTIVE | COMMUNITY

## 2024-11-25 RX ORDER — MELATONIN 10 MG
AS DIRECTED CAPSULE ORAL
Status: ACTIVE | COMMUNITY

## 2024-11-25 RX ORDER — OMEPRAZOLE 20 MG/1
TAKE 1 CAPSULE BY ORAL ROUTE DAILY FOR 30 DAYS CAPSULE, DELAYED RELEASE ORAL ONCE A DAY
Qty: 90 | Refills: 3
Start: 2024-11-15

## 2024-11-25 RX ORDER — MAGNESIUM OXIDE/MAG AA CHELATE 300 MG
1 CAPSULE WITH A MEAL CAPSULE ORAL ONCE A DAY
Status: ACTIVE | COMMUNITY

## 2024-11-25 RX ORDER — PANCRELIPASE 36000; 180000; 114000 [USP'U]/1; [USP'U]/1; [USP'U]/1
2 TABLETS WITH MEALS, AND 1 TABLET WITH SNACKS CAPSULE, DELAYED RELEASE PELLETS ORAL
Qty: 300 | Refills: 3 | Status: ACTIVE | COMMUNITY
Start: 2024-09-03 | End: 2025-08-29

## 2024-11-25 RX ORDER — PANCRELIPASE LIPASE, PANCRELIPASE AMYLASE, AND PANCRELIPASE PROTEASE 21000; 83900; 54700 [USP'U]/1; [USP'U]/1; [USP'U]/1
2 PILLS WITH MEALS CAPSULE, DELAYED RELEASE ORAL THREE TIMES A DAY
Qty: 250 | Refills: 3 | Status: ON HOLD | COMMUNITY
Start: 2024-10-11 | End: 2025-02-07

## 2024-11-25 RX ORDER — MIRTAZAPINE 15 MG/1
1 TABLET AT BEDTIME TABLET, FILM COATED ORAL ONCE A DAY
Status: ACTIVE | COMMUNITY

## 2024-11-25 RX ORDER — METHOCARBAMOL 750 MG/1
1 TABLET TABLET ORAL
Status: ACTIVE | COMMUNITY

## 2024-11-25 RX ORDER — PANCRELIPASE 36000; 180000; 114000 [USP'U]/1; [USP'U]/1; [USP'U]/1
2 TABLETS WITH MEALS, AND 1 TABLET WITH SNACKS CAPSULE, DELAYED RELEASE PELLETS ORAL
Qty: 300 | Refills: 3
Start: 2024-09-03 | End: 2025-11-20

## 2024-11-25 RX ORDER — TRAMADOL HYDROCHLORIDE 50 MG/1
1 TABLET AS NEEDED TABLET, FILM COATED ORAL ONCE A DAY
Status: ACTIVE | COMMUNITY

## 2024-11-25 NOTE — HPI-TODAY'S VISIT:
The patient is 79-year-old female with past medical history as noted below is presented to our clinic today for follow-up. The patient was not able to tolerate Creon and Zenpep.  She was given sample of  Pancrelipase last visit by Dr. Casey.  She was advised to start with low dose of Creo and advance as tolerated.  She was also advised to continue on magnesium and Dulcolax regimen at [facility] for the patient based.  Also continue with 3DSoC as it helps with abdominal bloating and gas. She was sent Omeprazole 20 mg obnce daily on 11/15/2024 to help with her symptoms. She is now on Creon 36,000 units one tablet mic times daily. She is ddoing great on current medical management. She is doing great. Good appetite and gaing weight. She is very happy about her improvement. No new GI issues today.    Diagnostic studies: -- CT scan of abdomen and pelvis on 09/12/2024 with no acute abnormality of abdomen and pelvis.  Mild bilateral nonobstructing nephrolithiasis.  Mild to moderate colonic diverticulosis.  Large Tarlov cyst on the right at S2-S3, associated with thinning of the sacral cortex, predisposing the patient to a pathological fracture.  -- GES on 08/20/204 with near-complete gastric emptying study at two hours. No gastroparesis. These findings can be seen in the dumping syndrome.  -- Stool study on 08/03/2024 GPP and WBC unremarkable.  Pancreatic elastase 147.52 consistent with exocrine pancreatic insufficiency.  -- Labs on 07/30/2024 CMP, CBC, amylase, lipase, CRP, TSH, free T4, and vitamin D with unremarkable results.   Procedures: -- Colonoscopy 11/20/2021 by Dr. Pillai noted several diverticula with mixed openings were seen in the whole colon.  Additional findings included in luminal narrowing.  Diverticulosis appeared to be of mild severity.  Most dense in the sigmoid.  Medium internal hemorrhoids were noted.  The colon and rectum were otherwise normal.  There was no evidence of polyps, masses, AVMs, or colitis visualized.  -- EGD with biopsy on 03/0/2011 by Dr. Zimmerman noted LA grade A reflux esophagitis.  Hiatal hernia.  Gastritis.  Normal examined duodenum.  Biopsy with normal gastric mucosa with mild chronic inflammation. No H.pylori organism or intestinal metaplasia. CT-kidney stones, cyst in the spine. did not tolerate creon/zenpep. Made her feel worse.

## 2025-01-17 ENCOUNTER — TELEPHONE ENCOUNTER (OUTPATIENT)
Dept: URBAN - METROPOLITAN AREA CLINIC 74 | Facility: CLINIC | Age: 80
End: 2025-01-17

## 2025-05-27 ENCOUNTER — OFFICE VISIT (OUTPATIENT)
Dept: URBAN - METROPOLITAN AREA CLINIC 74 | Facility: CLINIC | Age: 80
End: 2025-05-27
Payer: MEDICARE

## 2025-05-27 DIAGNOSIS — R15.1 FECAL SMEARING: ICD-10-CM

## 2025-05-27 DIAGNOSIS — K86.81 EXOCRINE PANCREATIC INSUFFICIENCY: ICD-10-CM

## 2025-05-27 DIAGNOSIS — K59.02 DYSSYNERGIC DEFECATION: ICD-10-CM

## 2025-05-27 DIAGNOSIS — R14.0 BLOATING SYMPTOM: ICD-10-CM

## 2025-05-27 DIAGNOSIS — K59.09 CHRONIC CONSTIPATION: ICD-10-CM

## 2025-05-27 PROCEDURE — 99214 OFFICE O/P EST MOD 30 MIN: CPT | Performed by: PHYSICIAN ASSISTANT

## 2025-05-27 RX ORDER — PANCRELIPASE 36000; 180000; 114000 [USP'U]/1; [USP'U]/1; [USP'U]/1
2 TABLETS WITH MEALS, AND 1 TABLET WITH SNACKS CAPSULE, DELAYED RELEASE PELLETS ORAL
Qty: 300 | Refills: 3
Start: 2025-05-14

## 2025-05-27 RX ORDER — MAGNESIUM OXIDE/MAG AA CHELATE 300 MG
1 CAPSULE WITH A MEAL CAPSULE ORAL ONCE A DAY
Status: ACTIVE | COMMUNITY

## 2025-05-27 RX ORDER — MELATONIN 10 MG
AS DIRECTED CAPSULE ORAL
Status: ACTIVE | COMMUNITY

## 2025-05-27 RX ORDER — OMEPRAZOLE 20 MG/1
TAKE 1 CAPSULE BY ORAL ROUTE DAILY FOR 30 DAYS CAPSULE, DELAYED RELEASE ORAL ONCE A DAY
Qty: 90 | Refills: 3
Start: 2025-05-14

## 2025-05-27 RX ORDER — MIRTAZAPINE 15 MG/1
1 TABLET AT BEDTIME TABLET, FILM COATED ORAL ONCE A DAY
Status: ACTIVE | COMMUNITY

## 2025-05-27 RX ORDER — OMEPRAZOLE 20 MG/1
TAKE 1 CAPSULE BY ORAL ROUTE DAILY FOR 30 DAYS CAPSULE, DELAYED RELEASE ORAL ONCE A DAY
Qty: 90 | Refills: 3 | Status: ACTIVE | COMMUNITY
Start: 2024-11-15

## 2025-05-27 RX ORDER — PANCRELIPASE 36000; 180000; 114000 [USP'U]/1; [USP'U]/1; [USP'U]/1
2 TABLETS WITH MEALS, AND 1 TABLET WITH SNACKS CAPSULE, DELAYED RELEASE PELLETS ORAL
Qty: 300 | Refills: 3 | Status: ACTIVE | COMMUNITY
Start: 2024-09-03 | End: 2025-11-20

## 2025-05-27 RX ORDER — METHOCARBAMOL 750 MG/1
1 TABLET TABLET ORAL
Status: ACTIVE | COMMUNITY

## 2025-05-27 RX ORDER — TRAMADOL HYDROCHLORIDE 50 MG/1
1 TABLET AS NEEDED TABLET, FILM COATED ORAL ONCE A DAY
Status: ACTIVE | COMMUNITY

## 2025-05-27 RX ORDER — ALPRAZOLAM 0.5 MG/1
1 TABLET TABLET ORAL TWICE A DAY
Status: ACTIVE | COMMUNITY

## 2025-05-27 NOTE — HPI-TODAY'S VISIT:
The patient is 79-year-old female with past medical history as noted below known to Dr. Casey is presented to our clinic today for follow-up appointment. She continues on Omeprazole 20 mg once daily as needed and Creon 36,000 units one tablet before each meals and one before snack as needed. She is doing overall much better however, she has occasional fecal smearing. She denies any new GI issues today.   Diagnostic studies: -- CT scan of abdomen and pelvis on 09/12/2024 with no acute abnormality of abdomen and pelvis.  Mild bilateral nonobstructing nephrolithiasis.  Mild to moderate colonic diverticulosis.  Large Tarlov cyst on the right at S2-S3, associated with thinning of the sacral cortex, predisposing the patient to a pathological fracture.  -- GES on 08/20/204 with near-complete gastric emptying study at two hours. No gastroparesis. These findings can be seen in the dumping syndrome.  -- Stool study on 08/03/2024 GPP and WBC unremarkable.  Pancreatic elastase 147.52 consistent with exocrine pancreatic insufficiency.  -- Labs on 07/30/2024 CMP, CBC, amylase, lipase, CRP, TSH, free T4, and vitamin D with unremarkable results.  Procedures: -- Colonoscopy 11/20/2021 by Dr. Pillai noted several diverticula with mixed openings were seen in the whole colon.  Additional findings included in luminal narrowing.  Diverticulosis appeared to be of mild severity.  Most dense in the sigmoid.  Medium internal hemorrhoids were noted.  The colon and rectum were otherwise normal.  There was no evidence of polyps, masses, AVMs, or colitis visualized.  -- EGD with biopsy on 03/0/2011 by Dr. Zimmerman noted LA grade A reflux esophagitis.  Hiatal hernia.  Gastritis.  Normal examined duodenum.  Biopsy with normal gastric mucosa with mild chronic inflammation. No H.pylori organism or intestinal metaplasia. CT-kidney stones, cyst in the spine. did not tolerate creon/zenpep. Made her feel worse.